# Patient Record
Sex: FEMALE | Race: BLACK OR AFRICAN AMERICAN | NOT HISPANIC OR LATINO | URBAN - METROPOLITAN AREA
[De-identification: names, ages, dates, MRNs, and addresses within clinical notes are randomized per-mention and may not be internally consistent; named-entity substitution may affect disease eponyms.]

---

## 2024-07-01 ENCOUNTER — OFFICE VISIT (OUTPATIENT)
Dept: URGENT CARE | Facility: CLINIC | Age: 49
End: 2024-07-01

## 2024-07-01 VITALS
DIASTOLIC BLOOD PRESSURE: 94 MMHG | OXYGEN SATURATION: 98 % | BODY MASS INDEX: 36.48 KG/M2 | RESPIRATION RATE: 18 BRPM | TEMPERATURE: 98 F | HEART RATE: 68 BPM | HEIGHT: 66 IN | WEIGHT: 227 LBS | SYSTOLIC BLOOD PRESSURE: 137 MMHG

## 2024-07-01 DIAGNOSIS — M54.42 ACUTE LEFT-SIDED LOW BACK PAIN WITH LEFT-SIDED SCIATICA: Primary | ICD-10-CM

## 2024-07-01 PROCEDURE — 99204 OFFICE O/P NEW MOD 45 MIN: CPT | Mod: PBBFAC | Performed by: NURSE PRACTITIONER

## 2024-07-01 PROCEDURE — 63600175 PHARM REV CODE 636 W HCPCS

## 2024-07-01 PROCEDURE — 99203 OFFICE O/P NEW LOW 30 MIN: CPT | Mod: S$PBB,,, | Performed by: NURSE PRACTITIONER

## 2024-07-01 RX ORDER — PREGABALIN 100 MG/1
100 CAPSULE ORAL 2 TIMES DAILY
COMMUNITY

## 2024-07-01 RX ORDER — OXYCODONE AND ACETAMINOPHEN 10; 325 MG/1; MG/1
1 TABLET ORAL EVERY 4 HOURS PRN
COMMUNITY

## 2024-07-01 RX ORDER — KETOROLAC TROMETHAMINE 30 MG/ML
60 INJECTION, SOLUTION INTRAMUSCULAR; INTRAVENOUS
Status: COMPLETED | OUTPATIENT
Start: 2024-07-01 | End: 2024-07-01

## 2024-07-01 RX ADMIN — KETOROLAC TROMETHAMINE 60 MG: 30 INJECTION, SOLUTION INTRAMUSCULAR at 08:07

## 2024-07-02 NOTE — PATIENT INSTRUCTIONS
You received a Toradol injection in clinic - do not take NSAIDs (ibuprofen, naproxen, Advil, Motrin, Aleve) for the next 8 hours.     Take Rx medications as prescribed and use only when needed, not on a schedule.    You need to follow up with a PCP to properly treat your ongoing back pain.    Use good body mechanics when working/lifting. Stretch your abdominals, hamstrings, buttocks, and thighs frequently. Drink plenty of water.     Medications may not resolve your issue.  Please see the included patient education for guidance on exercises and stretches.  There is a large amount of information and help available online as well.    If home routine and medications do not help your pain, please consider seeing a chiropractor, massage therapist or physical therapist even 1 time for manual manipulation.     **Drug [place]: Dr Leon's [place] for soreness, muscle recovery, post-workout. Large bags are $5, pour in tub of warm water and soak for 10-20 minutes.    **Light range of motion of the affected muscles.    **Roll muscle spasm on Lacrosse ball or tennis ball    **Heating pad

## 2024-07-02 NOTE — PROGRESS NOTES
"Subjective:      Patient ID: Tri Singh is a 49 y.o. female.    Vitals:  height is 5' 6" (1.676 m) and weight is 103 kg (227 lb). Her oral temperature is 98.3 °F (36.8 °C). Her blood pressure is 137/94 (abnormal) and her pulse is 68. Her respiration is 18 and oxygen saturation is 98%.     Chief Complaint: Shooting pain (Pt reports L sided shooting pain from lower back down to L foot x 1 day )    HPI As stated in CC. Pt reports being prescribed muscle relaxers however patient states she has not taking those medications because this does not help with her pain.  Patient denies any weakness or numbness and tingling to her extremities bowel or bladder incontinence, fever, stomach pain nausea or vomiting.    ROS   Objective:     Physical Exam   Constitutional: She is oriented to person, place, and time. She appears well-developed.  Non-toxic appearance. She does not appear ill. No distress.   HENT:   Head: Normocephalic and atraumatic.   Nose: No purulent discharge. Right sinus exhibits no maxillary sinus tenderness and no frontal sinus tenderness. Left sinus exhibits no maxillary sinus tenderness and no frontal sinus tenderness.   Mouth/Throat: Uvula is midline. Mucous membranes are moist.   Eyes: Conjunctivae are normal. Right eye exhibits no discharge. Left eye exhibits no discharge. Extraocular movement intact   Neck: Neck supple. No erythema present. No neck rigidity present. No decreased range of motion present.   Cardiovascular: Regular rhythm.   Pulmonary/Chest: Effort normal and breath sounds normal. No respiratory distress. She has no wheezes. She has no rales.   Abdominal: Normal appearance. Soft.   Musculoskeletal:         General: Tenderness and signs of injury present. No swelling or deformity.      Thoracic back: She exhibits tenderness and spasm. She exhibits normal range of motion and no bony tenderness.      Lumbar back: She exhibits tenderness and spasm. She exhibits normal range of motion and no " bony tenderness.        Back:       Right lower leg: No edema.      Left lower leg: No edema.   Lymphadenopathy:     She has no cervical adenopathy.   Neurological: She is alert and oriented to person, place, and time.   Skin: Skin is warm, dry and not diaphoretic. Capillary refill takes less than 2 seconds.   Psychiatric: Her behavior is normal. Mood, judgment and thought content normal.   Nursing note and vitals reviewed.      Assessment:     1. Acute left-sided low back pain with left-sided sciatica        Plan:     Pt refused muscle relaxer.   Given Toradol injection for pain and instructed to continue medications as prescribed by PCP and painmangement  Acute left-sided low back pain with left-sided sciatica    Other orders  -     ketorolac injection 60 mg          Medical Decision Making:   Differential Diagnosis:   Low back muscle strain